# Patient Record
Sex: FEMALE | Race: WHITE | Employment: UNEMPLOYED | URBAN - METROPOLITAN AREA
[De-identification: names, ages, dates, MRNs, and addresses within clinical notes are randomized per-mention and may not be internally consistent; named-entity substitution may affect disease eponyms.]

---

## 2017-02-16 ENCOUNTER — APPOINTMENT (OUTPATIENT)
Dept: HEMATOLOGY/ONCOLOGY | Facility: HOSPITAL | Age: 47
End: 2017-02-16
Attending: INTERNAL MEDICINE
Payer: COMMERCIAL

## 2018-03-16 ENCOUNTER — OFFICE VISIT (OUTPATIENT)
Dept: HEMATOLOGY/ONCOLOGY | Facility: HOSPITAL | Age: 48
End: 2018-03-16
Attending: INTERNAL MEDICINE
Payer: COMMERCIAL

## 2018-03-16 VITALS
BODY MASS INDEX: 21 KG/M2 | TEMPERATURE: 98 F | WEIGHT: 140.5 LBS | DIASTOLIC BLOOD PRESSURE: 67 MMHG | HEART RATE: 74 BPM | RESPIRATION RATE: 16 BRPM | SYSTOLIC BLOOD PRESSURE: 108 MMHG

## 2018-03-16 DIAGNOSIS — Z85.3 PERSONAL HISTORY OF BREAST CANCER: ICD-10-CM

## 2018-03-16 DIAGNOSIS — Z85.3 HISTORY OF RIGHT BREAST CANCER: Primary | ICD-10-CM

## 2018-03-16 LAB
ALBUMIN SERPL-MCNC: 3.9 G/DL (ref 3.5–4.8)
ALP LIVER SERPL-CCNC: 52 U/L (ref 39–100)
ALT SERPL-CCNC: 16 U/L (ref 14–54)
AST SERPL-CCNC: 17 U/L (ref 15–41)
BASOPHILS # BLD AUTO: 0.05 X10(3) UL (ref 0–0.1)
BASOPHILS NFR BLD AUTO: 0.9 %
BILIRUB SERPL-MCNC: 0.4 MG/DL (ref 0.1–2)
BREAST CARCINOMA AG (CA2729): 27.3 U/ML (ref ?–38)
BUN BLD-MCNC: 11 MG/DL (ref 8–20)
CALCIUM BLD-MCNC: 9.1 MG/DL (ref 8.3–10.3)
CHLORIDE: 106 MMOL/L (ref 101–111)
CO2: 25 MMOL/L (ref 22–32)
CREAT BLD-MCNC: 0.8 MG/DL (ref 0.55–1.02)
EOSINOPHIL # BLD AUTO: 0.07 X10(3) UL (ref 0–0.3)
EOSINOPHIL NFR BLD AUTO: 1.2 %
ERYTHROCYTE [DISTWIDTH] IN BLOOD BY AUTOMATED COUNT: 16.7 % (ref 11.5–16)
ESTRADIOL: 267.3 PG/ML
FSH: 5.9 MIU/ML
GLUCOSE BLD-MCNC: 90 MG/DL (ref 70–99)
HCT VFR BLD AUTO: 34.9 % (ref 34–50)
HGB BLD-MCNC: 11 G/DL (ref 12–16)
IMMATURE GRANULOCYTE COUNT: 0.01 X10(3) UL (ref 0–1)
IMMATURE GRANULOCYTE RATIO %: 0.2 %
LH: 4.3 MIU/ML
LYMPHOCYTES # BLD AUTO: 1.92 X10(3) UL (ref 0.9–4)
LYMPHOCYTES NFR BLD AUTO: 32.9 %
M PROTEIN MFR SERPL ELPH: 7.5 G/DL (ref 6.1–8.3)
MCH RBC QN AUTO: 24.8 PG (ref 27–33.2)
MCHC RBC AUTO-ENTMCNC: 31.5 G/DL (ref 31–37)
MCV RBC AUTO: 78.8 FL (ref 81–100)
MONOCYTES # BLD AUTO: 0.46 X10(3) UL (ref 0.1–1)
MONOCYTES NFR BLD AUTO: 7.9 %
NEUTROPHIL ABS PRELIM: 3.32 X10 (3) UL (ref 1.3–6.7)
NEUTROPHILS # BLD AUTO: 3.32 X10(3) UL (ref 1.3–6.7)
NEUTROPHILS NFR BLD AUTO: 56.9 %
PLATELET # BLD AUTO: 296 10(3)UL (ref 150–450)
POTASSIUM SERPL-SCNC: 3.9 MMOL/L (ref 3.6–5.1)
RBC # BLD AUTO: 4.43 X10(6)UL (ref 3.8–5.1)
RED CELL DISTRIBUTION WIDTH-SD: 47.8 FL (ref 35.1–46.3)
SODIUM SERPL-SCNC: 138 MMOL/L (ref 136–144)
WBC # BLD AUTO: 5.8 X10(3) UL (ref 4–13)

## 2018-03-16 PROCEDURE — 99213 OFFICE O/P EST LOW 20 MIN: CPT | Performed by: INTERNAL MEDICINE

## 2018-03-16 NOTE — PROGRESS NOTES
[facility] Progress Note    Patient Name: Valery Oshea   YOB: 1970   Medical Record Number: [id number]   CSN: [id number]   Attending Physician: Grace Scott M.D.    Referring Physician: Claudetta Seaman, MD      Date of Visit: 3/16/2018     SHANIQUE Spouse name: N/A    Years of education: N/A  Number of children: N/A     Occupational History  None on file     Social History Main Topics   Smoking status: Never Smoker    Smokeless tobacco: Not on file    Alcohol use No    Drug use: No    Sexual activity 7.5 03/16/2018   ALB 3.9 03/16/2018    03/16/2018   K 3.9 03/16/2018    03/16/2018   CO2 25.0 03/16/2018       Impression and Plan:  I last saw Amadou Bailey in 2015. She has not had a mammogram in years.  She said she wanted to avoid radiation from this

## 2018-09-26 PROCEDURE — 87624 HPV HI-RISK TYP POOLED RSLT: CPT | Performed by: OBSTETRICS & GYNECOLOGY

## 2018-09-26 PROCEDURE — 88175 CYTOPATH C/V AUTO FLUID REDO: CPT | Performed by: OBSTETRICS & GYNECOLOGY

## 2019-04-22 ENCOUNTER — TELEPHONE (OUTPATIENT)
Dept: HEMATOLOGY/ONCOLOGY | Facility: HOSPITAL | Age: 49
End: 2019-04-22

## 2019-04-22 ENCOUNTER — OFFICE VISIT (OUTPATIENT)
Dept: HEMATOLOGY/ONCOLOGY | Facility: HOSPITAL | Age: 49
End: 2019-04-22
Attending: INTERNAL MEDICINE
Payer: COMMERCIAL

## 2019-04-22 VITALS
BODY MASS INDEX: 21 KG/M2 | WEIGHT: 141 LBS | SYSTOLIC BLOOD PRESSURE: 122 MMHG | RESPIRATION RATE: 18 BRPM | OXYGEN SATURATION: 99 % | TEMPERATURE: 98 F | HEART RATE: 67 BPM | DIASTOLIC BLOOD PRESSURE: 70 MMHG

## 2019-04-22 DIAGNOSIS — Z85.3 PERSONAL HISTORY OF BREAST CANCER: Primary | ICD-10-CM

## 2019-04-22 DIAGNOSIS — J06.9 ACUTE URI: ICD-10-CM

## 2019-04-22 PROCEDURE — 99213 OFFICE O/P EST LOW 20 MIN: CPT | Performed by: CLINICAL NURSE SPECIALIST

## 2019-04-22 NOTE — PROGRESS NOTES
[facility] Progress Note    Patient Name: Ottoniel Bruce   YOB: 1970   Medical Record Number: [id number]   CSN: [id number]   Date of visit: 4/22/2019   Provider: SADIE Ann Ala  Referring Physician: No ref.  provider found    Problem rhythm  Abdomen:  Non-distended, normoactive bowel sounds, soft,nontender, no hepatosplenomegaly.   Extremities:  No edema, no tenderness  Neuro:  CN 2-12 intact, strength and sensation intact x4 extremities, concentration intact (recited months of the year

## 2019-04-22 NOTE — PROGRESS NOTES
Patient presents with:  Nasal Congestion: APN assessment; sinus pressure/facial swelling    Pt is here for a sick call - pt has sinus pressure.  She reports that starting last Thursday she started feeling nasal tightness combined with some facial swelling a

## 2020-03-20 ENCOUNTER — TELEPHONE (OUTPATIENT)
Dept: HEMATOLOGY/ONCOLOGY | Facility: HOSPITAL | Age: 50
End: 2020-03-20

## 2020-03-20 DIAGNOSIS — Z85.3 PERSONAL HISTORY OF BREAST CANCER: Primary | ICD-10-CM

## 2020-03-20 DIAGNOSIS — M25.552 LEFT HIP PAIN: ICD-10-CM

## 2020-03-23 ENCOUNTER — APPOINTMENT (OUTPATIENT)
Dept: HEMATOLOGY/ONCOLOGY | Facility: HOSPITAL | Age: 50
End: 2020-03-23
Attending: INTERNAL MEDICINE
Payer: COMMERCIAL

## 2020-09-24 ENCOUNTER — OFFICE VISIT (OUTPATIENT)
Dept: HEMATOLOGY/ONCOLOGY | Facility: HOSPITAL | Age: 50
End: 2020-09-24
Attending: INTERNAL MEDICINE
Payer: COMMERCIAL

## 2020-09-24 VITALS
TEMPERATURE: 98 F | OXYGEN SATURATION: 97 % | DIASTOLIC BLOOD PRESSURE: 70 MMHG | HEART RATE: 89 BPM | WEIGHT: 132 LBS | BODY MASS INDEX: 20 KG/M2 | RESPIRATION RATE: 16 BRPM | SYSTOLIC BLOOD PRESSURE: 111 MMHG

## 2020-09-24 DIAGNOSIS — M25.552 LEFT HIP PAIN: ICD-10-CM

## 2020-09-24 DIAGNOSIS — Z85.3 PERSONAL HISTORY OF BREAST CANCER: ICD-10-CM

## 2020-09-24 LAB
ALBUMIN SERPL-MCNC: 3.9 G/DL (ref 3.4–5)
ALBUMIN/GLOB SERPL: 0.8 {RATIO} (ref 1–2)
ALP LIVER SERPL-CCNC: 63 U/L
ALT SERPL-CCNC: 27 U/L
ANION GAP SERPL CALC-SCNC: 5 MMOL/L (ref 0–18)
AST SERPL-CCNC: 16 U/L (ref 15–37)
BASOPHILS # BLD AUTO: 0.04 X10(3) UL (ref 0–0.2)
BASOPHILS NFR BLD AUTO: 0.7 %
BILIRUB SERPL-MCNC: 0.4 MG/DL (ref 0.1–2)
BRCA1 C.185DELAG BLD/T QL: 36.5 U/ML (ref ?–38)
BUN BLD-MCNC: 12 MG/DL (ref 7–18)
BUN/CREAT SERPL: 14 (ref 10–20)
CALCIUM BLD-MCNC: 9.5 MG/DL (ref 8.5–10.1)
CHLORIDE SERPL-SCNC: 109 MMOL/L (ref 98–112)
CO2 SERPL-SCNC: 24 MMOL/L (ref 21–32)
CREAT BLD-MCNC: 0.86 MG/DL
DEPRECATED RDW RBC AUTO: 50.1 FL (ref 35.1–46.3)
EOSINOPHIL # BLD AUTO: 0.1 X10(3) UL (ref 0–0.7)
EOSINOPHIL NFR BLD AUTO: 1.8 %
ERYTHROCYTE [DISTWIDTH] IN BLOOD BY AUTOMATED COUNT: 16.9 % (ref 11–15)
GLOBULIN PLAS-MCNC: 5.2 G/DL (ref 2.8–4.4)
GLUCOSE BLD-MCNC: 111 MG/DL (ref 70–99)
HCT VFR BLD AUTO: 35.2 %
HGB BLD-MCNC: 10.6 G/DL
IMM GRANULOCYTES # BLD AUTO: 0.01 X10(3) UL (ref 0–1)
IMM GRANULOCYTES NFR BLD: 0.2 %
LYMPHOCYTES # BLD AUTO: 1.97 X10(3) UL (ref 1–4)
LYMPHOCYTES NFR BLD AUTO: 36 %
M PROTEIN MFR SERPL ELPH: 9.1 G/DL (ref 6.4–8.2)
MCH RBC QN AUTO: 24.8 PG (ref 26–34)
MCHC RBC AUTO-ENTMCNC: 30.1 G/DL (ref 31–37)
MCV RBC AUTO: 82.4 FL
MONOCYTES # BLD AUTO: 0.37 X10(3) UL (ref 0.1–1)
MONOCYTES NFR BLD AUTO: 6.8 %
NEUTROPHILS # BLD AUTO: 2.98 X10 (3) UL (ref 1.5–7.7)
NEUTROPHILS # BLD AUTO: 2.98 X10(3) UL (ref 1.5–7.7)
NEUTROPHILS NFR BLD AUTO: 54.5 %
OSMOLALITY SERPL CALC.SUM OF ELEC: 286 MOSM/KG (ref 275–295)
PLATELET # BLD AUTO: 275 10(3)UL (ref 150–450)
POTASSIUM SERPL-SCNC: 3.9 MMOL/L (ref 3.5–5.1)
RBC # BLD AUTO: 4.27 X10(6)UL
SODIUM SERPL-SCNC: 138 MMOL/L (ref 136–145)
WBC # BLD AUTO: 5.5 X10(3) UL (ref 4–11)

## 2020-09-24 PROCEDURE — 99215 OFFICE O/P EST HI 40 MIN: CPT | Performed by: INTERNAL MEDICINE

## 2020-09-24 NOTE — PROGRESS NOTES
[facility] Progress Note    Patient Name: Sherrell Russo   YOB: 1970   Medical Record Number: [id number]   CSN: [id number]   Attending Physician: Kylah German M.D.        Date of Visit: 9/24/2020     Chief Complaint:  Patient presents with   T4    L4    SAB0  TAB0  Ectopic0  Multiple0  Live Births4       Comment: Four uncomplicated vaginal deliveries    Psychosocial History:  Social History    Socioeconomic History      Marital status:       Spouse name: Not on file      N Examination:  General: Patient is alert and oriented x 3, not in acute distress. Psych:  Mood and affect appropriate  HEENT: EOMs intact. PERRL. Oropharynx is clear. Neck: No JVD. No palpable lymphadenopathy. Neck is supple. Lymphatics:  There is no pal 80.0 - 100.0 fL    MCH 24.8 (L) 26.0 - 34.0 pg    MCHC 30.1 (L) 31.0 - 37.0 g/dL    RDW 16.9 (H) 11.0 - 15.0 %    RDW-SD 50.1 (H) 35.1 - 46.3 fL    Neutrophil Absolute Prelim 2.98 1.50 - 7.70 x10 (3) uL    Neutrophil Absolute 2.98 1.50 - 7.70 x10(3) uL discussed issues of distress, coping difficulties and social support systems and currently there are no active problems.     Niki Mata MD  [address] [facility]

## 2020-09-25 DIAGNOSIS — Z85.3 PERSONAL HISTORY OF BREAST CANCER: Primary | ICD-10-CM

## 2020-09-30 ENCOUNTER — TELEPHONE (OUTPATIENT)
Dept: HEMATOLOGY/ONCOLOGY | Facility: HOSPITAL | Age: 50
End: 2020-09-30

## 2021-10-13 ENCOUNTER — HOSPITAL ENCOUNTER (EMERGENCY)
Facility: HOSPITAL | Age: 51
Discharge: HOME | End: 2021-10-13
Payer: COMMERCIAL

## 2021-10-13 VITALS
HEIGHT: 67 IN | BODY MASS INDEX: 24.01 KG/M2 | TEMPERATURE: 99 F | SYSTOLIC BLOOD PRESSURE: 103 MMHG | DIASTOLIC BLOOD PRESSURE: 65 MMHG | WEIGHT: 153 LBS | HEART RATE: 84 BPM | RESPIRATION RATE: 18 BRPM | OXYGEN SATURATION: 99 %

## 2021-10-13 DIAGNOSIS — U07.1 COVID-19: Primary | ICD-10-CM

## 2021-10-13 PROCEDURE — 99284 EMERGENCY DEPT VISIT MOD MDM: CPT

## 2021-10-13 NOTE — ED QUICK NOTES
ASSUMED CARE TO THIS PT WHO CAME IN AMBULATORY WITH STEADY GAIT TO ROOM 64 FROM TRIAGE FOR HEADACHE, BODY ACHES  STARTED YESTERDAY, COUGH STARTED TODAY. PT REPORTS POSITIVE COVID TEST , HOME TEST TODAY. PT IS A/O X 4, BREATHING IS UNLABORED.   PT CHANGED

## 2021-10-13 NOTE — ED PROVIDER NOTES
Patient Seen in: [facility] AND [facility] Emergency Department      History   Patient presents with:  Covid    Stated Complaint: tested positve for @home covid test     Subjective:   HPI    71-year-old female with history of breast cancer 2011, presents to the Rhythm: Normal rate. Pulses: Normal pulses. Musculoskeletal:      Cervical back: Normal range of motion. Skin:     General: Skin is warm and dry. Capillary Refill: Capillary refill takes less than 2 seconds.    Neurological:      General: No f

## 2021-10-13 NOTE — ED INITIAL ASSESSMENT (HPI)
Pt reports covid positive from home test, symptoms headache yesterday and and bodyaches, pt came for antibody infusion

## 2021-10-14 ENCOUNTER — TELEPHONE (OUTPATIENT)
Dept: CASE MANAGEMENT | Age: 51
End: 2021-10-14

## 2021-10-14 NOTE — TELEPHONE ENCOUNTER
Attempted to call patient RE: [address] is full and I am unable to leave a message for pt to call back.

## 2023-03-15 ENCOUNTER — TELEPHONE (OUTPATIENT)
Dept: HEMATOLOGY/ONCOLOGY | Facility: HOSPITAL | Age: 53
End: 2023-03-15

## 2023-03-15 NOTE — TELEPHONE ENCOUNTER
Spouse called, patient hx of breast cancer, last visit 9/2020. He wanted to make apt to have Sabrina  be seen by MD due to having some dizziness since Sunday. She was out at [employer] and got dizzy and needed to hold on to someone. Also has dizziness wihen lying down at times. Has been having to remove Contacts more frequently with eyes getting cloudy. Patient does not have a PCP. Denies fevers, no sob or chest pain, has had cold symptoms 3 times this winter. Denies n/v/d. Is eating and drinking. No urinary or bowel complaints. Suggested for her to be seen in Urgent Care. Will forward this information to MD for advice as well.

## 2023-03-15 NOTE — TELEPHONE ENCOUNTER
Called Radha Amaral and per Md referred them to Urgent care for evaluation. Also to set up follow up with MD after. He verbalizes understanding.

## 2024-12-06 ENCOUNTER — OFFICE VISIT (OUTPATIENT)
Dept: HEMATOLOGY/ONCOLOGY | Facility: HOSPITAL | Age: 54
End: 2024-12-06
Attending: INTERNAL MEDICINE
Payer: COMMERCIAL

## 2024-12-06 VITALS
BODY MASS INDEX: 20 KG/M2 | OXYGEN SATURATION: 97 % | WEIGHT: 132 LBS | SYSTOLIC BLOOD PRESSURE: 129 MMHG | RESPIRATION RATE: 20 BRPM | DIASTOLIC BLOOD PRESSURE: 81 MMHG | TEMPERATURE: 97 F | HEART RATE: 83 BPM

## 2024-12-06 DIAGNOSIS — R97.8 ELEVATED TUMOR MARKERS: ICD-10-CM

## 2024-12-06 DIAGNOSIS — E83.52 HYPERCALCEMIA: ICD-10-CM

## 2024-12-06 DIAGNOSIS — R42 DIZZINESS: ICD-10-CM

## 2024-12-06 DIAGNOSIS — Z85.3 PERSONAL HISTORY OF BREAST CANCER: Primary | ICD-10-CM

## 2024-12-06 LAB
ALBUMIN SERPL-MCNC: 4.7 G/DL (ref 3.2–4.8)
ALBUMIN/GLOB SERPL: 1.4 {RATIO} (ref 1–2)
ALP LIVER SERPL-CCNC: 84 U/L
ALT SERPL-CCNC: 9 U/L
ANION GAP SERPL CALC-SCNC: 7 MMOL/L (ref 0–18)
AST SERPL-CCNC: 17 U/L (ref ?–34)
BASOPHILS # BLD AUTO: 0.04 X10(3) UL (ref 0–0.2)
BASOPHILS NFR BLD AUTO: 0.7 %
BILIRUB SERPL-MCNC: 0.7 MG/DL (ref 0.3–1.2)
BRCA1 C.185DELAG BLD/T QL: 65.9 U/ML (ref ?–38)
BUN BLD-MCNC: 13 MG/DL (ref 9–23)
CALCIUM BLD-MCNC: 11 MG/DL (ref 8.7–10.4)
CHLORIDE SERPL-SCNC: 102 MMOL/L (ref 98–112)
CO2 SERPL-SCNC: 27 MMOL/L (ref 21–32)
CREAT BLD-MCNC: 0.84 MG/DL
DEPRECATED HBV CORE AB SER IA-ACNC: 76 NG/ML
EGFRCR SERPLBLD CKD-EPI 2021: 83 ML/MIN/1.73M2 (ref 60–?)
EOSINOPHIL # BLD AUTO: 0.07 X10(3) UL (ref 0–0.7)
EOSINOPHIL NFR BLD AUTO: 1.2 %
ERYTHROCYTE [DISTWIDTH] IN BLOOD BY AUTOMATED COUNT: 12.7 %
GLOBULIN PLAS-MCNC: 3.3 G/DL (ref 2–3.5)
GLUCOSE BLD-MCNC: 95 MG/DL (ref 70–99)
HCT VFR BLD AUTO: 45.2 %
HGB BLD-MCNC: 15 G/DL
IMM GRANULOCYTES # BLD AUTO: 0.01 X10(3) UL (ref 0–1)
IMM GRANULOCYTES NFR BLD: 0.2 %
IRON SATN MFR SERPL: 27 %
IRON SERPL-MCNC: 86 UG/DL
LYMPHOCYTES # BLD AUTO: 2.34 X10(3) UL (ref 1–4)
LYMPHOCYTES NFR BLD AUTO: 40.2 %
MCH RBC QN AUTO: 29.8 PG (ref 26–34)
MCHC RBC AUTO-ENTMCNC: 33.2 G/DL (ref 31–37)
MCV RBC AUTO: 89.9 FL
MONOCYTES # BLD AUTO: 0.39 X10(3) UL (ref 0.1–1)
MONOCYTES NFR BLD AUTO: 6.7 %
NEUTROPHILS # BLD AUTO: 2.97 X10 (3) UL (ref 1.5–7.7)
NEUTROPHILS # BLD AUTO: 2.97 X10(3) UL (ref 1.5–7.7)
NEUTROPHILS NFR BLD AUTO: 51 %
OSMOLALITY SERPL CALC.SUM OF ELEC: 282 MOSM/KG (ref 275–295)
PLATELET # BLD AUTO: 259 10(3)UL (ref 150–450)
POTASSIUM SERPL-SCNC: 4 MMOL/L (ref 3.5–5.1)
PROT SERPL-MCNC: 8 G/DL (ref 5.7–8.2)
PTH-INTACT SERPL-MCNC: 51.6 PG/ML (ref 18.5–88)
RBC # BLD AUTO: 5.03 X10(6)UL
SODIUM SERPL-SCNC: 136 MMOL/L (ref 136–145)
TOTAL IRON BINDING CAPACITY: 319 UG/DL (ref 250–425)
TRANSFERRIN SERPL-MCNC: 249 MG/DL (ref 250–380)
TSI SER-ACNC: 1.5 UIU/ML (ref 0.55–4.78)
VIT D+METAB SERPL-MCNC: 38.4 NG/ML (ref 30–100)
WBC # BLD AUTO: 5.8 X10(3) UL (ref 4–11)

## 2024-12-06 PROCEDURE — 99215 OFFICE O/P EST HI 40 MIN: CPT | Performed by: INTERNAL MEDICINE

## 2024-12-06 NOTE — PROGRESS NOTES
[facility]    Patient Name: Ruth Duff   YOB: 1970   Medical Record Number: [id number]   CSN: [id number]   Attending Physician: Brie Patton M.D.   Referring Physician: Unknown Pcp      Date of Visit: 12/6/2024     Chief Complaint:  Chief Complaint   Patient presents with    Follow - Up     Breast cancer pt here for f/u, last seen 2020. She c/o dizziness for the past 6 months, was in ER once for this. Balance is off, having vision changes  Around [place] was sick, fevers/HA/fatigue/body aches, lasted 3 weeks. She recovered from that but symptoms now returning.  Right middle finger infection, prescribed antibiotics but only took for two days. Lost from 160 to 128 since March, few pounds was on purpose but continues to lose weight due to taste changes from virus. Right ear feels full.        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 ER(+), [facility] (-), HER-2 negative. BRCA negative. Opted not to take tamoxifen.       History of Present Illness:  I last saw Ruth in 2020. She said she had been doing ok until about 6 months ago.  Started having dizzy spells, balance off and vision changes. Went to the ED back in May but waited for 2 hours and decided to leave. Then seemed to feel better with symptoms intermittent and improved. Around [place] had a bug- had fever, headaches, fatigue, body aches. She said symptoms lasted for 3 weeks and eventually did get better. Son also had same symptoms. Lately symptoms have returned except for fever. She also had an infection of the nail of her middle finger.  Was placed on antibiotics and so only took this for a couple of days then stop. Finger is better. No periods for over a year. Breasts he says are fine. Her right ear feels full and lately has metallic/blood taste in mouth. Has lost weight- from 160 --> 128 pounds since March.  She said she initially wanted to lose weight intentionally but since she has had taste changes her appetite diminished.       Current Medications:  No current outpatient medications on file.    Past Medical History:  Past Medical History:    Anemia    Breast cancer (HCC)    Right Mastectomy and 25 lymph nodes removed    CANCER    breast cancer, R mastectomy       Past Surgical History:  Past Surgical History:   Procedure Laterality Date    Appendectomy  1982    Implant right  2011    Mastectomy right  2011    Single and LND       Family Medical History:  Family History   Problem Relation Age of Onset    Other (Migraines) Mother     Gastro-Intestinal Disorder Father        Gyne History:  OB History    Para Term  AB Living   4 4 4 0 0 4   SAB IAB Ectopic Multiple Live Births   0 0 0 0 4   Obstetric Comments   Four uncomplicated vaginal deliveries       Psychosocial History:  Social History     Socioeconomic History    Marital status:      Spouse name: Not on file    Number of children: Not on file    Years of education: Not on file    Highest education level: Not on file   Occupational History    Not on file   Tobacco Use    Smoking status: Never    Smokeless tobacco: Never   Vaping Use    Vaping status: Never Used   Substance and Sexual Activity    Alcohol use: No    Drug use: No    Sexual activity: Yes     Partners: Male   Other Topics Concern    Not on file   Social History Narrative    Not on file     Social Drivers of Health     Financial Resource Strain: Not on file   Food Insecurity: Not on file   Transportation Needs: Not on file   Physical Activity: Not on file   Stress: Not on file   Social Connections: Not on file   Housing Stability: Not on file         Allergies:  No Known Allergies     Review of Systems:  A 14-point ROS was done with pertinent positives and negative per the HPI    Vital Signs:  /81 (BP Location: Left arm, Cuff Size: adult)   Pulse 83   Temp 97.3 °F  (36.3 °C) (Temporal)   Resp 20   Wt 59.9 kg (132 lb)   SpO2 97%   BMI 20.07 kg/m²         Physical Examination:  General: Patient is alert and oriented x 3, not in acute distress.  Psych:  Mood and affect appropriate  HEENT: EOMs intact. PERRL. Oropharynx is clear. Right ear canal completely covered with wax, could  not see ear drum. Left was clear  Neck: No JVD. No palpable lymphadenopathy. Neck is supple.  Lymphatics: There is no palpable lymphadenopathy throughout in the cervical, supraclavicular, axillary, or inguinal regions.  Chest: Clear to auscultation.  Heart: Regular rate and rhythm.   Abdomen: Soft, non tender with good bowel sounds.  No hepatosplenomegaly.  No palpable mass.  Extremities: Pedal pulses are present. No edema.  Neurological: Grossly intact. No nystagmus  Breast: Left without masses; right s/p mastectomy with palpable nodules at 12:00 and at 2:00    Laboratory:  Recent Results (from the past 24 hours)   CBC W/DIFF [E]    Collection Time: 12/06/24 11:22 AM   Result Value Ref Range    WBC 5.8 4.0 - 11.0 x10(3) uL    RBC 5.03 3.80 - 5.30 x10(6)uL    HGB 15.0 12.0 - 16.0 g/dL    HCT 45.2 35.0 - 48.0 %    .0 150.0 - 450.0 10(3)uL    MCV 89.9 80.0 - 100.0 fL    MCH 29.8 26.0 - 34.0 pg    MCHC 33.2 31.0 - 37.0 g/dL    RDW 12.7 %    Neutrophil Absolute Prelim 2.97 1.50 - 7.70 x10 (3) uL    Neutrophil Absolute 2.97 1.50 - 7.70 x10(3) uL    Lymphocyte Absolute 2.34 1.00 - 4.00 x10(3) uL    Monocyte Absolute 0.39 0.10 - 1.00 x10(3) uL    Eosinophil Absolute 0.07 0.00 - 0.70 x10(3) uL    Basophil Absolute 0.04 0.00 - 0.20 x10(3) uL    Immature Granulocyte Absolute 0.01 0.00 - 1.00 x10(3) uL    Neutrophil % 51.0 %    Lymphocyte % 40.2 %    Monocyte % 6.7 %    Eosinophil % 1.2 %    Basophil % 0.7 %    Immature Granulocyte % 0.2 %   COMP METABOLIC PANEL [E]    Collection Time: 12/06/24 11:22 AM   Result Value Ref Range    Glucose 95 70 - 99 mg/dL    Sodium 136 136 - 145 mmol/L    Potassium 4.0  3.5 - 5.1 mmol/L    Chloride 102 98 - 112 mmol/L    CO2 27.0 21.0 - 32.0 mmol/L    Anion Gap 7 0 - 18 mmol/L    BUN 13 9 - 23 mg/dL    Creatinine 0.84 0.55 - 1.02 mg/dL    Calcium, Total 11.0 (H) 8.7 - 10.4 mg/dL    Calculated Osmolality 282 275 - 295 mOsm/kg    eGFR-Cr 83 >=60 mL/min/1.73m2    AST 17 <34 U/L    ALT 9 (L) 10 - 49 U/L    Alkaline Phosphatase 84 41 - 108 U/L    Bilirubin, Total 0.7 0.3 - 1.2 mg/dL    Total Protein 8.0 5.7 - 8.2 g/dL    Albumin 4.7 3.2 - 4.8 g/dL    Globulin  3.3 2.0 - 3.5 g/dL    A/G Ratio 1.4 1.0 - 2.0    Patient Fasting for CMP? Patient not present    FERRITIN [E]    Collection Time: 12/06/24 11:22 AM   Result Value Ref Range    Ferritin 76 50 - 306 ng/mL   IRON AND TIBC [E]    Collection Time: 12/06/24 11:22 AM   Result Value Ref Range    Iron 86 50 - 170 ug/dL    Transferrin 249 (L) 250 - 380 mg/dL    Total Iron Binding Capacity 319 250 - 425 ug/dL    % Saturation 27 15 - 50 %   TSH [E]    Collection Time: 12/06/24 11:22 AM   Result Value Ref Range    TSH 1.495 0.550 - 4.780 uIU/mL   CA 27.29 [E]    Collection Time: 12/06/24 11:22 AM   Result Value Ref Range    Breast Carcinoma Ag [id number] 65.9 (H) <38.0 u/mL   Vitamin D, 25-Hydroxy    Collection Time: 12/06/24 11:22 AM   Result Value Ref Range    Vitamin D, 25OH, Total 38.4 30.0 - 100.0 ng/mL   PTH, Intact    Collection Time: 12/06/24 11:22 AM   Result Value Ref Range    Pth Intact 51.6 18.5 - 88.0 pg/mL       Impression and Plan:  1. Breast cancer- I last saw Ruth in 2020. She declined adjuvant therapy and has not had a mammogram since 2011. She previously mentioned she wanted to avoid radiation from this and was having some type of alternative imaging at a homeopathic place but not recently. We have had several discussions about this (please see my previous notes). She has palpable nodules in the right breast/chest wall. She is agreeable to have a mammogram done now but only a screening mammogram. After they had left her CA 27-29  came back elevated. I sent her a message through [employer] to repeat.  If still elevated will need imaging.     2. Hypercalcemia- had sent her a message if she takes calcium and have not heard back. In the interim added iPTH to labs which came back normal and therefore malignancy less likely culprit.     3. Anemia- was from her periods. Has not had a period for some time and Hgb normal. She asked we draw iron studies which showed no evidence of deficiency.     4. Dizziness, dysgeusia, balance issues, right ear fullness, headaches. Could not see right ear canal.drum as was obscured with wax. Recommended she have her right ear flushed so a more thorough exam could be done. If wax removed and nothing seen in right ear and symptoms persist, will consider imaging. Component of BPV. Does not like to take meds so we will wait to see how she does.       Labs reviewed.   Repeat CA 27-29  Recommend she have a PCP. They said they communicate with Dr. Oc Reddy as needed      [facility]

## 2024-12-09 ENCOUNTER — TELEPHONE (OUTPATIENT)
Dept: HEMATOLOGY/ONCOLOGY | Facility: HOSPITAL | Age: 54
End: 2024-12-09

## 2024-12-09 ENCOUNTER — NURSE ONLY (OUTPATIENT)
Dept: HEMATOLOGY/ONCOLOGY | Facility: HOSPITAL | Age: 54
End: 2024-12-09
Attending: INTERNAL MEDICINE
Payer: COMMERCIAL

## 2024-12-09 DIAGNOSIS — Z85.3 PERSONAL HISTORY OF BREAST CANCER: Primary | ICD-10-CM

## 2024-12-09 DIAGNOSIS — Z85.3 PERSONAL HISTORY OF BREAST CANCER: ICD-10-CM

## 2024-12-09 DIAGNOSIS — R97.8 ELEVATED TUMOR MARKERS: ICD-10-CM

## 2024-12-09 LAB — BRCA1 C.185DELAG BLD/T QL: 65.5 U/ML (ref ?–38)

## 2024-12-09 PROCEDURE — 86300 IMMUNOASSAY TUMOR CA 15-3: CPT

## 2024-12-09 PROCEDURE — 36415 COLL VENOUS BLD VENIPUNCTURE: CPT

## 2024-12-09 RX ORDER — DIAZEPAM 5 MG/1
TABLET ORAL
Qty: 4 TABLET | Refills: 0 | Status: SHIPPED | OUTPATIENT
Start: 2024-12-09

## 2024-12-09 NOTE — TELEPHONE ENCOUNTER
Jason called to say he and Ruth received a message from Dr Patton that Ruth's CA 27.29 is elevated and it might be an error in the draw. Ruth needs to have it redrawn. Sid said he was not sure if it should be done today or not? He scheduled a 1 pm lab draw today. Sid is asking for a call back this morning.     I told him I would forward his question to Dr Patton and her nurse now.

## 2024-12-09 NOTE — TELEPHONE ENCOUNTER
I called Sid and updated him that Dr Patton does want Ruth to have her lab draw at 1 pm today. He said they are leaving now.

## 2024-12-09 NOTE — TELEPHONE ENCOUNTER
Tried to call Ruth/Sid 3 times- got disconnected twice while talking to them when I was mid-sentence and tried to call back but no answer. Sent message through [employer]

## 2024-12-10 ENCOUNTER — PATIENT MESSAGE (OUTPATIENT)
Dept: HEMATOLOGY/ONCOLOGY | Facility: HOSPITAL | Age: 54
End: 2024-12-10

## 2024-12-10 ENCOUNTER — TELEPHONE (OUTPATIENT)
Dept: HEMATOLOGY/ONCOLOGY | Facility: HOSPITAL | Age: 54
End: 2024-12-10

## 2024-12-10 ENCOUNTER — HOSPITAL ENCOUNTER (OUTPATIENT)
Dept: MRI IMAGING | Facility: HOSPITAL | Age: 54
Discharge: HOME | End: 2024-12-10
Attending: INTERNAL MEDICINE
Payer: COMMERCIAL

## 2024-12-10 ENCOUNTER — OFFICE VISIT (OUTPATIENT)
Dept: SURGERY | Facility: CLINIC | Age: 54
End: 2024-12-10
Payer: COMMERCIAL

## 2024-12-10 VITALS — HEART RATE: 80 BPM | DIASTOLIC BLOOD PRESSURE: 70 MMHG | SYSTOLIC BLOOD PRESSURE: 110 MMHG

## 2024-12-10 DIAGNOSIS — D49.6 BRAIN TUMOR (HCC): Primary | ICD-10-CM

## 2024-12-10 DIAGNOSIS — R97.8 ELEVATED TUMOR MARKERS: ICD-10-CM

## 2024-12-10 DIAGNOSIS — Z85.3 PERSONAL HISTORY OF BREAST CANCER: ICD-10-CM

## 2024-12-10 PROCEDURE — A9575 INJ GADOTERATE MEGLUMI 0.1ML: HCPCS | Performed by: INTERNAL MEDICINE

## 2024-12-10 PROCEDURE — 3074F SYST BP LT 130 MM HG: CPT | Performed by: NEUROLOGICAL SURGERY

## 2024-12-10 PROCEDURE — 70553 MRI BRAIN STEM W/O & W/DYE: CPT | Performed by: INTERNAL MEDICINE

## 2024-12-10 PROCEDURE — 99205 OFFICE O/P NEW HI 60 MIN: CPT | Performed by: NEUROLOGICAL SURGERY

## 2024-12-10 PROCEDURE — 3078F DIAST BP <80 MM HG: CPT | Performed by: NEUROLOGICAL SURGERY

## 2024-12-10 RX ORDER — GADOTERATE MEGLUMINE 376.9 MG/ML
20 INJECTION INTRAVENOUS
Status: COMPLETED | OUTPATIENT
Start: 2024-12-10 | End: 2024-12-10

## 2024-12-10 RX ORDER — DEXAMETHASONE 4 MG/1
4 TABLET ORAL 2 TIMES DAILY WITH MEALS
Qty: 120 TABLET | Refills: 0 | Status: SHIPPED | OUTPATIENT
Start: 2024-12-10 | End: 2024-12-10

## 2024-12-10 RX ADMIN — GADOTERATE MEGLUMINE 18 ML: 376.9 INJECTION INTRAVENOUS at 10:57:00

## 2024-12-10 NOTE — TELEPHONE ENCOUNTER
Images reviewed at neuro-onc. Recommended she see NSG who called them and seeing them later today.

## 2024-12-10 NOTE — TELEPHONE ENCOUNTER
Spoke to Matti and went over MRI brain which showed a right sided mass with mass effect. Discussed starting steroids and PPI to help with swelling and should help with symptoms. I messaged neuro-onc team to have them review and see if a candidate for surgery or best option to manage this. Having PET on Monday.

## 2024-12-11 NOTE — H&P
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  She reports six months of balance difficulty, dizziness, and some visual disturbance, but not double vision.  She reports right ear \"fullness'. She denies any swallowing difficulty.  She has lost 30lbs since March.  She had a viral illness the end of November that worsened her balance difficulty and dizziness, but it was better today.  This past week, she was found to have elevated breast cancer tumor marker CA27.29. She has a pending PET scan on Monday.  MRI brain with and without contrast 12/10/24 demonstrates a  3.7 x 3.0 x 3.0 cm right cerebellopontine angle heterogeneously enhancing extra-axial lesion dorsal to 7/8 complex with slight extension into the right jugular foramen causing brainstem compression without brainstem or cerebellar edema.    PAST MEDICAL HISTORY:  Past Medical History:    Anemia    Breast cancer (HCC)    Right Mastectomy and 25 lymph nodes removed    CANCER    breast cancer, R mastectomy       PAST SURGICAL HISTORY:  Past Surgical History:   Procedure Laterality Date    Appendectomy  1982    Implant right  11/2011    Mastectomy right  02/2011    Single and LND       FAMILY HISTORY:  family history includes Gastro-Intestinal Disorder in her father; Migraines in her mother.    SOCIAL HISTORY:   reports that she has never smoked. She has never used smokeless tobacco. She reports that she does not drink alcohol and does not use drugs.    ALLERGIES:  Allergies[1]    MEDICATIONS:  Medications Ordered Prior to  Encounter[2]    REVIEW OF SYSTEMS:  A 10-point system was reviewed.  Pertinent positives and negatives are noted in HPI.      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  We reviewed the imaging other.  We discussed the differential diagnosis for this lesion.  We discussed that additional workup is necessary for the possibility of metastatic systemic disease given her weight loss and elevated tumor markers.  Even if there is evidence of metastatic cancer, this would not fully clarify the pathology of his intracranial lesion.  We discussed that there is a possibility that this is a paraganglioma, and that further workup should be done with lab tests to assess for a pheochromocytoma.  We also discussed that establishing a baseline audiology assessment will be meaningful.  We discussed the treatment options including observation versus radiation therapy versus surgical resection.  I discussed the risks, benefits, alternatives, goals, and expectations of surgery which will most likely be the best course of action given the brainstem compression and unclear pathology.  We also discussed a diagnostic cerebral angiogram and possible tumor embolization as a preoperative adjunct.  After this discussion and answering her questions, the patient and her  expressed understanding and agreement with the plan.    Plan:  -Pheochromocytoma labs ordered  -Audiology referral placed  -We will await results of PET scan  -Pending the results we will determine the next steps of care which may include a  diagnostic cerebral angiogram and possible tumor embolization followed by tumor resection    Sang Shaw MD  Neurological Surgery  [facility] Time: 60 min including face to face time, chart review, imaging interpretation, and coordination of care         [1] No Known Allergies  [2]   Current Outpatient Medications on File Prior to Visit   Medication Sig Dispense Refill    diazePAM 5 MG Oral Tab Take 1 tab 45 min before MRI and 1 tab just before as needed. DO NOT DRIVE (Patient not taking: Reported on 12/10/2024) 4 tablet 0     No current facility-administered medications on file prior to visit.

## 2024-12-16 ENCOUNTER — TELEPHONE (OUTPATIENT)
Age: 54
End: 2024-12-16

## 2024-12-16 ENCOUNTER — HOSPITAL ENCOUNTER (OUTPATIENT)
Dept: NUCLEAR MEDICINE | Facility: HOSPITAL | Age: 54
Discharge: HOME | End: 2024-12-16
Attending: INTERNAL MEDICINE
Payer: COMMERCIAL

## 2024-12-16 ENCOUNTER — TELEPHONE (OUTPATIENT)
Dept: SURGERY | Facility: CLINIC | Age: 54
End: 2024-12-16

## 2024-12-16 DIAGNOSIS — R97.8 ELEVATED TUMOR MARKERS: ICD-10-CM

## 2024-12-16 DIAGNOSIS — Z85.3 PERSONAL HISTORY OF BREAST CANCER: ICD-10-CM

## 2024-12-16 LAB — GLUCOSE BLDC GLUCOMTR-MCNC: 92 MG/DL (ref 70–99)

## 2024-12-16 PROCEDURE — 82962 GLUCOSE BLOOD TEST: CPT

## 2024-12-16 PROCEDURE — 78815 PET IMAGE W/CT SKULL-THIGH: CPT | Performed by: INTERNAL MEDICINE

## 2024-12-16 NOTE — TELEPHONE ENCOUNTER
Called Ruth and Sid and went over her PET results. 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 I forwarded results to NS to discuss next steps. Her current symptoms are mainly neurological felt to be from the right brain lesion. If NSG/neuro-onc team feel the best way to control lesion is to resect this- then this will serve as out biopsy. If they feel unrelated or plan is not to resect will proceed with biopsy of one of the lung lesions. Will also review at lung conference.       Ruth has not started the steroids as she does not feel her symptoms \"are bad enough\" and prefers not to take this. She will wait to see what [facility] has to say.

## 2024-12-16 NOTE — TELEPHONE ENCOUNTER
Spoke to the patient and  regarding the PET scan findings. Discussed with Dr. Patton.  Will discuss at neuro tumor board. Will schedule clinic visit later this week.

## 2024-12-19 ENCOUNTER — OFFICE VISIT (OUTPATIENT)
Dept: SURGERY | Facility: CLINIC | Age: 54
End: 2024-12-19
Payer: COMMERCIAL

## 2024-12-19 VITALS
OXYGEN SATURATION: 99 % | BODY MASS INDEX: 18.94 KG/M2 | DIASTOLIC BLOOD PRESSURE: 66 MMHG | HEART RATE: 97 BPM | SYSTOLIC BLOOD PRESSURE: 110 MMHG | WEIGHT: 125 LBS | HEIGHT: 68 IN

## 2024-12-19 DIAGNOSIS — D49.6 BRAIN TUMOR (HCC): Primary | ICD-10-CM

## 2024-12-19 PROCEDURE — 3074F SYST BP LT 130 MM HG: CPT | Performed by: NEUROLOGICAL SURGERY

## 2024-12-19 PROCEDURE — 3078F DIAST BP <80 MM HG: CPT | Performed by: NEUROLOGICAL SURGERY

## 2024-12-19 PROCEDURE — 99214 OFFICE O/P EST MOD 30 MIN: CPT | Performed by: NEUROLOGICAL SURGERY

## 2024-12-19 PROCEDURE — 3008F BODY MASS INDEX DOCD: CPT | Performed by: NEUROLOGICAL SURGERY

## 2024-12-19 NOTE — PATIENT INSTRUCTIONS
Refill policies:    Allow 2-3 business days for refills; controlled substances may take longer.  Contact your pharmacy at least 5 days prior to running out of medication and have them send an electronic request or submit request through the “request refill” option in your [facility] account.  Refills are not addressed on weekends; covering physicians do not authorize routine medications on weekends.  No narcotics or controlled substances are refilled after noon on Fridays or by on call physicians.  By law, narcotics must be electronically prescribed.  A 30 day supply with no refills is the maximum allowed.  If your prescription is due for a refill, you may be due for a follow up appointment.  To best provide you care, patients receiving routine medications need to be seen at least once a year.  Patients receiving narcotic/controlled substance medications need to be seen at least once every 3 months.  In the event that your preferred pharmacy does not have the requested medication in stock (e.g. Backordered), it is your responsibility to find another pharmacy that has the requested medication available.  We will gladly send a new prescription to that pharmacy at your request.    Scheduling Tests:    If your physician has ordered radiology tests such as MRI or CT scans, please contact Central Scheduling at [phone] right away to schedule the test.  Once scheduled, the [facility] Centralized Referral Team will work with your insurance carrier to obtain pre-certification or prior authorization.  Depending on your insurance carrier, approval may take 3-10 days.  It is highly recommended patients assure they have received an authorization before having a test performed.  If test is done without insurance authorization, patient may be responsible for the entire amount billed.      Precertification and Prior Authorizations:  If your physician has recommended that you have a procedure or additional testing performed the [facility]  Centralized Referral Team will contact your insurance carrier to obtain pre-certification or prior authorization.    You are strongly encouraged to contact your insurance carrier to verify that your procedure/test has been approved and is a COVERED benefit.  Although the [facility] Centralized Referral Team does its due diligence, the insurance carrier gives the disclaimer that \"Although the procedure is authorized, this does not guarantee payment.\"    Ultimately the patient is responsible for payment.   Thank you for your understanding in this matter.  Paperwork Completion:  If you require FMLA or disability paperwork for your recovery, please make sure to either drop it off or have it faxed to our office at [phone]. Be sure the form has your name and date of birth on it.  The form will be faxed to our Forms Department and they will complete it for you.  There is a 25$ fee for all forms that need to be filled out.  Please be aware there is a 10-14 day turnaround time.  You will need to sign a release of information (ASPEN) form if your paperwork does not come with one.  You may call the Forms Department with any questions at [phone].  Their fax number is [phone].

## 2024-12-20 ENCOUNTER — OFFICE VISIT (OUTPATIENT)
Age: 54
End: 2024-12-20
Payer: COMMERCIAL

## 2024-12-20 VITALS
WEIGHT: 125 LBS | OXYGEN SATURATION: 96 % | SYSTOLIC BLOOD PRESSURE: 108 MMHG | HEART RATE: 80 BPM | RESPIRATION RATE: 16 BRPM | BODY MASS INDEX: 18.94 KG/M2 | HEIGHT: 68 IN | DIASTOLIC BLOOD PRESSURE: 66 MMHG

## 2024-12-20 DIAGNOSIS — R91.8 MULTIPLE LUNG NODULES: Primary | ICD-10-CM

## 2024-12-20 PROCEDURE — 3008F BODY MASS INDEX DOCD: CPT | Performed by: INTERNAL MEDICINE

## 2024-12-20 PROCEDURE — 3074F SYST BP LT 130 MM HG: CPT | Performed by: INTERNAL MEDICINE

## 2024-12-20 PROCEDURE — 99204 OFFICE O/P NEW MOD 45 MIN: CPT | Performed by: INTERNAL MEDICINE

## 2024-12-20 PROCEDURE — 3078F DIAST BP <80 MM HG: CPT | Performed by: INTERNAL MEDICINE

## 2024-12-20 NOTE — PROGRESS NOTES
[facility] General Pulmonary Consult Note    Chief Complaint:  Chief Complaint   Patient presents with    New Patient     Lung nodule PET 12/16       History of Present Illness:  Ruth Duff is a 54 year old female with significant PMH of breast cancer in 2021 and recently diagnosed brain mass who presents today for evaluation of lung nodules.  Patient was having some nausea and dizziness so she went to be evaluated in the emergency room had CT scan as listed below.  Patient had follow-up PET scan as well.  Patient is a never smoker.  No history of lung cancer previously.  Denies any significant breathing difficulties.      Past Medical History:   Past Medical History:    Anemia    Breast cancer (HCC)    Right Mastectomy and 25 lymph nodes removed    CANCER    breast cancer, R mastectomy        Past Surgical History:   Past Surgical History:   Procedure Laterality Date    Appendectomy  01/01/1982    Appendectomy  1984    Implant right  11/01/2011    Mastectomy right  02/01/2011    Single and LND    Other surgical history  2011    Mastectomy       Family Medical History:   Family History   Problem Relation Age of Onset    Other (Migraines) Mother     Gastro-Intestinal Disorder Father         Social History:   Social History     Socioeconomic History    Marital status:      Spouse name: Not on file    Number of children: Not on file    Years of education: Not on file    Highest education level: Not on file   Occupational History    Not on file   Tobacco Use    Smoking status: Never    Smokeless tobacco: Never   Vaping Use    Vaping status: Never Used   Substance and Sexual Activity    Alcohol use: No    Drug use: No    Sexual activity: Yes     Partners: Male   Other Topics Concern    Not on file   Social History Narrative    Not on file     Social Drivers of Health     Financial Resource Strain: Not on file   Food Insecurity: Not on file   Transportation Needs: Not on file   Physical Activity: Not on file   Stress:  Not on file   Social Connections: Not on file   Housing Stability: Not on file        Allergies: Patient has no known allergies.     Medications:   No current outpatient medications on file.       Review of Systems: Review of Systems    Physical Exam:  /66 (BP Location: Right arm, Patient Position: Sitting, Cuff Size: adult)   Pulse 80   Resp 16   Ht 5' 8\" (1.727 m)   Wt 125 lb (56.7 kg)   SpO2 96%   BMI 19.01 kg/m²      Constitutional: alert, cooperative. No acute distress.  HEENT: Head NC/AT. Nares normal. Septum midline. Mucosa normal. No drainage or sinus tenderness.. Mallampati 1+  Cardio: Regular rate and rhythm. Normal S1 and S2. No murmurs.   Respiratory: Thorax symmetrical with no labored breathing. clear to auscultation bilaterally  GI: NABS. Abd soft, non-tender.  Extremities: No clubbing or cyanosis. No BLE edema.    Neurologic: A&Ox3. No gross motor deficits.  Skin: Warm, dry  Psych: Calm, cooperative. Pleasant affect.    Results:  Personally reviewed  WBC: 5.8, done on 12/6/2024.  HGB: 15, done on 12/6/2024.  PLT: 259, done on 12/6/2024.     Glucose: 95, done on 12/6/2024.  Cr: 0.84, done on 12/6/2024.  Last eGFR was 83 on 12/6/2024.  CA: 11, done on 12/6/2024.  Na: 136, done on 12/6/2024.  K: 4, done on 12/6/2024.  Cl: 102, done on 12/6/2024.  CO2: 27, done on 12/6/2024.  Last ALB was 4.7% done on 12/6/2024.     PET STANDARD BODY SCAN (ONCOLOGY) (CPT=78815)    Result Date: 12/16/2024  CONCLUSION:  1.  There is history of right breast cancer.  Post mastectomy with breast reconstruction.  There is hypermetabolic activity within the posterior elements of T6 compatible with bone metastasis. 2.  Bilateral mildly hypermetabolic pulmonary nodules/masses measuring up to 4 cm in diameter.  They are concerning for metastasis although they could represent infection. 3.  Relative hypo metabolic region within the right cerebellar hemisphere covering a region measuring approximately 3 cm in diameter.   This may be due to prior infarct, metastatic disease, or other inflammatory/infectious process.  Recommend correlation with MRI brain with and without contrast.    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  The mass appears likely extra-axial.  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  Meningioma felt to be less likely.  Although location of the mass can be seen with epidermoid, the signal characteristics particularly on DWI imaging, along with the strong enhancement present is not typical for epidermoid.   LOCATION:  [facility]    Dictated by (CST): Jorge Mckeon MD on 12/10/2024 at 11:29 AM     Finalized by (CST): Jorge Mckeon MD on 12/10/2024 at 11:43 AM         Assessment/Plan:  #1.  Multiple lung nodules, PET avid  We discussed CT findings in detail and I reviewed images with patient  We discussed role of bronchoscopy  with EBUS guided biopsy and robot assisted navigational bronchoscopy  Benefits including EBUS assisted biopsy being superior to conventional bronchoscopy or mediastinoscopy were dicussed  Risks including but limited to bleeding, infection, pneumothorax, bronchospasm were discussed  Alternatives such as surgical biopsy vs CT guided biopsy were discussed  Patient understands procedure, risks, benefits, and alternatives and would like to proceed, will get scheduled as soon as feasible    No follow-ups on file.    Sanjiv Johnson MD  12/20/2024

## 2024-12-23 ENCOUNTER — PATIENT MESSAGE (OUTPATIENT)
Dept: SURGERY | Facility: CLINIC | Age: 54
End: 2024-12-23

## 2024-12-23 NOTE — PROGRESS NOTES
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  She reports six months of balance difficulty, dizziness, and some visual disturbance, but not double vision.  She reports right ear \"fullness'. She denies any swallowing difficulty.  She has lost 30lbs since March.  She had a viral illness the end of November that worsened her balance difficulty and dizziness, but it was better today.  This past week, she was found to have elevated breast cancer tumor marker CA27.29. She has a pending PET scan on Monday.  MRI brain with and without contrast 12/10/24 demonstrates a  3.7 x 3.0 x 3.0 cm right cerebellopontine angle heterogeneously enhancing extra-axial lesion dorsal to 7/8 complex with slight extension into the right jugular foramen causing brainstem compression without brainstem or cerebellar edema.    Interval history 12/19/2024  The patient underwent a PET scan 12/16/2024 which demonstrates both lung and a thoracic spine lesion, however the right cerebellopontine angle lesion is cool on this study.  We reviewed her case at neuro tumor board.  She reports increased fatigue given a 3-day fast which she is undergoing, but otherwise reports no new symptoms.  Pheo labs are still pending.    PAST MEDICAL HISTORY:  Past Medical History:    Anemia    Back problem    Breast cancer (HCC)    Right Mastectomy and 25 lymph nodes removed    CANCER    breast cancer, R mastectomy    Hx of motion sickness    Visual impairment    contacts and glasses       PAST SURGICAL HISTORY:  Past Surgical History:    Procedure Laterality Date    Appendectomy  01/01/1982    Appendectomy  1984    Implant right  11/01/2011    Mastectomy right  02/01/2011    Single and LND    Other surgical history  2011    Mastectomy       FAMILY HISTORY:  family history includes Gastro-Intestinal Disorder in her father; Migraines in her mother.    SOCIAL HISTORY:   reports that she has never smoked. She has never used smokeless tobacco. She reports that she does not drink alcohol and does not use drugs.    ALLERGIES:  Allergies[1]    MEDICATIONS:  Medications Ordered Prior to Encounter[2]    REVIEW OF SYSTEMS:  A 10-point system was reviewed.  Pertinent positives and negatives are noted in HPI.      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  We reviewed the imaging other.  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     Given the new finding of systemic disease concerning for metastasis from her prior breast cancer and that our suspicion is that the CP angle lesion is not malignancy, I recommended that we allow for further workup and management of her malignancy and will follow the CP angle lesion at this time with serial imaging.  I explained that surgical resection is likely to be needed at some point but her current oncologic care takes priority at  this time    After this discussion and answering her questions, the patient and her  expressed understanding and agreement with the plan.    Plan:  -Pheochromocytoma labs ordered  -Audiology referral placed  -Follow up in 2 months with a repeat MRI brain/IAC w/wo contrast    Sang Shaw MD  Neurological Surgery  [facility] Time: 30 min including face to face time, chart review, imaging interpretation, and coordination of care         [1] No Known Allergies  [2]   No current outpatient medications on file prior to visit.     No current facility-administered medications on file prior to visit.

## 2024-12-23 NOTE — TELEPHONE ENCOUNTER
Noted that patient's  has messaged and is asking if there is a concern about \"numbness in her arms and hands. Seem better now, should we keep an eye on this?\"    Per Dr. Shaw at office visit on 12.10.24:    \"Plan:  -Pheochromocytoma labs ordered  -Audiology referral placed  -We will await results of PET scan  -Pending the results we will determine the next steps of care which may include a diagnostic cerebral angiogram and possible tumor embolization followed by tumor resection\"    Routed to SADIE Hook.

## 2025-01-07 ENCOUNTER — TELEPHONE (OUTPATIENT)
Dept: SURGERY | Facility: CLINIC | Age: 55
End: 2025-01-07

## 2025-01-07 NOTE — TELEPHONE ENCOUNTER
Patient's spouse, Sid,  contacted the office requesting feedback whether the patient is okay to fly or not. Please contact and advise.

## 2025-01-07 NOTE — TELEPHONE ENCOUNTER
Message below noted.    Patient spouse Sid requesting if patient is okay to fly.    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  We reviewed the imaging other.  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      Given the new finding of systemic disease concerning for metastasis from her prior breast cancer and that our suspicion is that the CP angle lesion is not malignancy, I recommended that we allow for further workup and management of her malignancy and will follow the CP angle lesion at this time with serial imaging.  I explained that surgical resection is likely to be needed at some point but her current oncologic care takes priority at this time     After this discussion and answering her questions, the patient and her  expressed understanding and agreement with the plan.     Plan:  -Pheochromocytoma labs ordered  -Audiology referral placed  -Follow up in 2 months with a repeat MRI brain/IAC w/wo contrast\"    Routed to Provider.

## 2025-01-08 ENCOUNTER — PATIENT MESSAGE (OUTPATIENT)
Dept: SURGERY | Facility: CLINIC | Age: 55
End: 2025-01-08

## 2025-01-08 NOTE — TELEPHONE ENCOUNTER
Message below noted.    Patient requesting if it safe to travel (fly) with brain tumor.    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  We reviewed the imaging other.  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      Given the new finding of systemic disease concerning for metastasis from her prior breast cancer and that our suspicion is that the CP angle lesion is not malignancy, I recommended that we allow for further workup and management of her malignancy and will follow the CP angle lesion at this time with serial imaging.  I explained that surgical resection is likely to be needed at some point but her current oncologic care takes priority at this time     After this discussion and answering her questions, the patient and her  expressed understanding and agreement with the plan.     Plan:  -Pheochromocytoma labs ordered  -Audiology referral placed  -Follow up in 2 months with a repeat MRI brain/IAC w/wo contrast\"    Routed to Provider.

## 2025-01-08 NOTE — TELEPHONE ENCOUNTER
Message received from patient family.     Patient sleeps most of the day and does not leave her room other than to go to the bathroom. Patient weights about 113 pounds which is very low for her. Patient family requesting if the brain tumor is a bigger issue in terms of energy and drive.    Patient has tumors in her lung and spine as well.     Routed to Provider.

## 2025-01-11 NOTE — TELEPHONE ENCOUNTER
Spoke to the patient's . Having increased fatigue and some nausea over the past few weeks.  Persistent balance difficulty.  Missed biopsy appointment, being rescheduled.  Explained that we can get a head CT to rule out hydrocephalus.   expressed that they will see how the next few days are and will reach back out on Monday.  Discussed the signs and symptoms for which to seek emergent medical attention. He expressed understanding and agreement with the plan.

## 2025-01-14 ENCOUNTER — TELEPHONE (OUTPATIENT)
Facility: CLINIC | Age: 55
End: 2025-01-14

## 2025-01-14 ENCOUNTER — PATIENT MESSAGE (OUTPATIENT)
Facility: CLINIC | Age: 55
End: 2025-01-14

## 2025-01-14 NOTE — TELEPHONE ENCOUNTER
[employer] message sent to patient notifying if the attached file is related to her procedure. Advised to call our office and request to speak with aMry, to schedule procedure.

## 2025-01-14 NOTE — TELEPHONE ENCOUNTER
Tried to contact and spouse to go over procedure instructions.  Both went to voicemail.  [employer] message sent

## 2025-01-15 ENCOUNTER — TELEPHONE (OUTPATIENT)
Facility: CLINIC | Age: 55
End: 2025-01-15

## 2025-01-29 ENCOUNTER — PATIENT MESSAGE (OUTPATIENT)
Facility: CLINIC | Age: 55
End: 2025-01-29

## 2025-01-31 ENCOUNTER — PATIENT MESSAGE (OUTPATIENT)
Dept: SURGERY | Facility: CLINIC | Age: 55
End: 2025-01-31

## 2025-01-31 NOTE — TELEPHONE ENCOUNTER
Noted that patient's family member has messaged asking if MRI can be completed at an earlier date due to patient's condition.    Per patient's POA:    -she cannot walk without assistance.   -she vomits with a \"level change\".   -she lost some ability to hear.   -she is only able to speak with one eye open.       Per Dr. 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  We reviewed the imaging other.  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      Given the new finding of systemic disease concerning for metastasis from her prior breast cancer and that our suspicion is that the CP angle lesion is not malignancy, I recommended that we allow for further workup and management of her malignancy and will follow the CP angle lesion at this time with serial imaging.  I explained that surgical resection is likely to be needed at some point but her current oncologic care takes priority at this time     After this discussion and answering her questions, the patient and her  expressed understanding and agreement with the plan.     Plan:  -Pheochromocytoma labs ordered  -Audiology referral placed  -Follow up in 2 months with a repeat MRI brain/IAC w/wo contrast\"    Noted \"expected date\" on imaging order is 2.10.    Routed to BALDEV Hook

## 2025-02-02 NOTE — TELEPHONE ENCOUNTER
It can be done earlier than that, but I am not sure that they can get it earlier unless we change it to stat.

## 2025-02-03 NOTE — TELEPHONE ENCOUNTER
Noted that patient's MRI is not yet scheduled and patient and or  her family has viewed message with recommendation to schedule.     New message sent to patient, recommending she call [facility] to schedule MRI.

## 2025-08-05 ENCOUNTER — TELEPHONE (OUTPATIENT)
Facility: LOCATION | Age: 55
End: 2025-08-05

## 2025-08-13 ENCOUNTER — TELEPHONE (OUTPATIENT)
Facility: LOCATION | Age: 55
End: 2025-08-13